# Patient Record
Sex: MALE | Race: OTHER | HISPANIC OR LATINO | URBAN - METROPOLITAN AREA
[De-identification: names, ages, dates, MRNs, and addresses within clinical notes are randomized per-mention and may not be internally consistent; named-entity substitution may affect disease eponyms.]

---

## 2020-11-16 ENCOUNTER — EMERGENCY (EMERGENCY)
Facility: HOSPITAL | Age: 40
LOS: 1 days | Discharge: HOME | End: 2020-11-16
Attending: EMERGENCY MEDICINE
Payer: COMMERCIAL

## 2020-11-16 VITALS
HEART RATE: 107 BPM | HEIGHT: 67 IN | WEIGHT: 164.02 LBS | TEMPERATURE: 99 F | DIASTOLIC BLOOD PRESSURE: 96 MMHG | SYSTOLIC BLOOD PRESSURE: 138 MMHG | RESPIRATION RATE: 20 BRPM | OXYGEN SATURATION: 97 %

## 2020-11-16 PROCEDURE — 99284 EMERGENCY DEPT VISIT MOD MDM: CPT

## 2020-11-16 PROCEDURE — 96372 THER/PROPH/DIAG INJ SC/IM: CPT

## 2020-11-16 PROCEDURE — 73562 X-RAY EXAM OF KNEE 3: CPT | Mod: 26,LT

## 2020-11-16 PROCEDURE — 73562 X-RAY EXAM OF KNEE 3: CPT

## 2020-11-16 PROCEDURE — 99283 EMERGENCY DEPT VISIT LOW MDM: CPT | Mod: 25

## 2020-11-16 RX ORDER — OXYCODONE AND ACETAMINOPHEN 5; 325 MG/1; MG/1
1 TABLET ORAL ONCE
Refills: 0 | Status: DISCONTINUED | OUTPATIENT
Start: 2020-11-16 | End: 2020-11-16

## 2020-11-16 RX ORDER — DEXAMETHASONE 0.5 MG/5ML
10 ELIXIR ORAL ONCE
Refills: 0 | Status: COMPLETED | OUTPATIENT
Start: 2020-11-16 | End: 2020-11-16

## 2020-11-16 RX ADMIN — Medication 10 MILLIGRAM(S): at 17:33

## 2020-11-16 RX ADMIN — OXYCODONE AND ACETAMINOPHEN 1 TABLET(S): 5; 325 TABLET ORAL at 17:33

## 2020-11-16 NOTE — ED STATDOCS - ATTENDING CONTRIBUTION TO CARE
The patient seen and examined    Knee Pain  Most likely ligamentous injury    I, Emmett Arguelles, performed the initial face to face bedside interview with this patient regarding history of present illness, review of symptoms and relevant past medical, social and family history.  I completed an independent physical examination.  I was the initial provider who evaluated this patient. I have signed out the follow up of any pending tests (i.e. labs, radiological studies) to the resident.  I have communicated the patient’s plan of care and disposition with the resident..

## 2020-11-16 NOTE — ED ADULT TRIAGE NOTE - CHIEF COMPLAINT QUOTE
Patient arrived to ED today with c/o left knee pain after injuring himself while stepping up on a belt today.

## 2020-11-16 NOTE — ED PROCEDURE NOTE - ATTENDING CONTRIBUTION TO CARE
I, Emmett Arguelles, performed the initial face to face bedside interview with this patient regarding history of present illness, review of symptoms and relevant past medical, social and family history.  I completed an independent physical examination.  I was the initial provider who evaluated this patient. I have signed out the follow up of any pending tests (i.e. labs, radiological studies) to the resident.  I have communicated the patient’s plan of care and disposition with the resident

## 2020-11-16 NOTE — ED STATDOCS - NS ED ROS FT
CONSTITUTIONAL: No fevers, no chills  Eyes: No vision changes  Cardiovascular: No Chest pain  Respiratory: No SOB  Gastrointestinal: No n/v/c/d, no abd pain  Genitourinary: no dysuria, no hematuria  SKIN: no rashes.  MSK: +left knee pain  NEURO: no headache, no weakness, no numbness  PSYCHIATRIC: no SI/HI

## 2020-11-16 NOTE — ED STATDOCS - OBJECTIVE STATEMENT
Pt is a 39 y/o M w/PMHx ACL repair presents c/o knee pain.  Pt states he was at work at a [employer]  when he was going up stairs and felt hes knee begin to hurt acutely.  Pt states he did not feel an pop, denies trauma.  States it has been painful to walk, but has not taken anything for pain.

## 2020-11-16 NOTE — ED STATDOCS - PATIENT PORTAL LINK FT
You can access the FollowMyHealth Patient Portal offered by [facility] by registering at the following website: [email or web address]. By joining [facility]’s FollowMyHealth portal, you will also be able to view your health information using other applications (apps) compatible with our system.

## 2020-11-16 NOTE — ED STATDOCS - PHYSICAL EXAMINATION
General: well appearing, interactive, well nourished, NAD  HEENT: pupils equal and reactive, normal external ears bilaterally   Cardiac: RRR, no MRG appreciated  Resp: lungs clear to auscultation bilaterally, symmetric chest wall rise  Abd: soft, nontender, nondistended,   : no CVA tenderness  Neuro: Moving all extremities  Skin:  normal color for race  MSK: no deformity left knee, FROM, non ttp, no joint laxity, NVI

## 2020-11-16 NOTE — ED STATDOCS - CARE PROVIDER_API CALL
Calderon Queen)  Orthopaedic Surgery  [address] [place] [address]  Phone: [phone]  Fax: [phone]  Follow Up Time:

## 2020-12-02 ENCOUNTER — APPOINTMENT (OUTPATIENT)
Dept: ORTHOPEDIC SURGERY | Facility: CLINIC | Age: 40
End: 2020-12-02

## 2023-11-27 NOTE — ED PROCEDURE NOTE - CPROC ED COMPLICATIONS1
DR PATEL IS CHANGING YOUR MEDICATION.    DO NOT TAKE CARVEDILOL    NEW:  METOPROLOL SUCCINATE 25 MG ONCE A DAY- THIS WILL REPLACE CARVEDILOL   no

## 2024-01-19 ENCOUNTER — APPOINTMENT (OUTPATIENT)
Dept: GASTROENTEROLOGY | Facility: CLINIC | Age: 44
End: 2024-01-19
Payer: COMMERCIAL

## 2024-01-19 VITALS
OXYGEN SATURATION: 100 % | HEIGHT: 66 IN | DIASTOLIC BLOOD PRESSURE: 74 MMHG | HEART RATE: 70 BPM | RESPIRATION RATE: 16 BRPM | WEIGHT: 170 LBS | SYSTOLIC BLOOD PRESSURE: 118 MMHG | BODY MASS INDEX: 27.32 KG/M2

## 2024-01-19 DIAGNOSIS — Z82.49 FAMILY HISTORY OF ISCHEMIC HEART DISEASE AND OTHER DISEASES OF THE CIRCULATORY SYSTEM: ICD-10-CM

## 2024-01-19 DIAGNOSIS — Z87.19 PERSONAL HISTORY OF OTHER DISEASES OF THE DIGESTIVE SYSTEM: ICD-10-CM

## 2024-01-19 DIAGNOSIS — Z01.818 ENCOUNTER FOR OTHER PREPROCEDURAL EXAMINATION: ICD-10-CM

## 2024-01-19 DIAGNOSIS — Z80.9 FAMILY HISTORY OF MALIGNANT NEOPLASM, UNSPECIFIED: ICD-10-CM

## 2024-01-19 PROCEDURE — 99203 OFFICE O/P NEW LOW 30 MIN: CPT

## 2024-01-19 RX ORDER — IBUPROFEN 800 MG/1
TABLET, FILM COATED ORAL
Refills: 0 | Status: ACTIVE | COMMUNITY

## 2024-01-19 RX ORDER — OMEPRAZOLE MAGNESIUM 40 MG/1
CAPSULE, DELAYED RELEASE ORAL
Refills: 0 | Status: ACTIVE | COMMUNITY

## 2024-01-19 RX ORDER — LORATADINE 5 MG/5 ML
10 SOLUTION, ORAL ORAL
Refills: 0 | Status: ACTIVE | COMMUNITY

## 2024-01-19 NOTE — REVIEW OF SYSTEMS
[Fever] : no fever [Chills] : no chills [Feeling Tired] : not feeling tired [Recent Weight Loss (___ Lbs)] : no recent weight loss [Abdominal Pain] : abdominal pain [Vomiting] : no vomiting [Constipation] : no constipation [Diarrhea] : no diarrhea [Heartburn] : no heartburn [Melena (black stool)] : no melena [Bleeding] : no bleeding [Bloating (gassiness)] : no bloating [Negative] : Heme/Lymph

## 2024-01-19 NOTE — ASSESSMENT
[FreeTextEntry1] : 43-year-old male with a history of gastritis presents for evaluation of left upper quadrant abdominal pain.  Left upper quadrant abdominal pain In the setting of NSAIDs symptoms may be related to gastritis versus peptic ulcer disease Will start pantoprazole 30 minutes before breakfast on an empty stomach Plan for diagnostic endoscopy Will obtain abdominal ultrasound to rule out atypical biliary pathology  He is 43 years old, recommend patient follow-up for colonoscopy in May for colon cancer screening

## 2024-01-19 NOTE — PHYSICAL EXAM
[Alert] : alert [Normal Voice/Communication] : normal voice/communication [No Acute Distress] : no acute distress [Sclera] : the sclera and conjunctiva were normal [Hearing Threshold Finger Rub Not [place]] : hearing was normal [Normal Lips/Gums] : the lips and gums were normal [Oropharynx] : the oropharynx was normal [Normal Appearance] : the appearance of the neck was normal [No Neck Mass] : no neck mass was observed [No Respiratory Distress] : no respiratory distress [No Acc Muscle Use] : no accessory muscle use [Respiration, Rhythm And Depth] : normal respiratory rhythm and effort [Auscultation Breath Sounds / Voice Sounds] : lungs were clear to auscultation bilaterally [Heart Rate And Rhythm] : heart rate was normal and rhythm regular [None] : no edema [Bowel Sounds] : normal bowel sounds [Abdomen Tenderness] : non-tender [No Masses] : no abdominal mass palpated [Abdomen Soft] : soft [Cervical Lymph Nodes Enlarged Posterior Bilaterally] : no posterior cervical lymphadenopathy [Supraclavicular Lymph Nodes Enlarged Bilaterally] : no supraclavicular lymphadenopathy [Cervical Lymph Nodes Enlarged Anterior Bilaterally] : no anterior cervical lymphadenopathy [No CVA Tenderness] : no CVA  tenderness [No Spinal Tenderness] : no spinal tenderness [Abnormal Walk] : normal gait [Normal Color / Pigmentation] : normal skin color and pigmentation [No Focal Deficits] : no focal deficits [Oriented To Time, Place, And Person] : oriented to person, place, and time

## 2024-01-19 NOTE — HISTORY OF PRESENT ILLNESS
[FreeTextEntry1] : 43-year-old male with a history of gastritis presents for evaluation chronic left upper quadrant pain.  He reports burning left upper quadrant pain made worse with meals.  He denies associated nausea or vomiting.  He denies diarrhea or melena.  He has a history of gastritis in the past.  He underwent endoscopy in the past.  He reports NSAID use for musculoskeletal pain.  He denies family history of GI malignancy. Surgical history notable for left knee and left toe surgeries.  He denies tobacco use.  He reports social alcohol use on the weekends.  He denies illicit drug use.  He has tried Prilosec in the past without relief of symptoms.

## 2024-01-29 ENCOUNTER — APPOINTMENT (OUTPATIENT)
Dept: GASTROENTEROLOGY | Facility: GI CENTER | Age: 44
End: 2024-01-29

## 2024-01-29 NOTE — PHYSICAL EXAM
[Alert] : alert [Normal Voice/Communication] : normal voice/communication [No Acute Distress] : no acute distress [Sclera] : the sclera and conjunctiva were normal [Hearing Threshold Finger Rub Not [place]] : hearing was normal [Normal Lips/Gums] : the lips and gums were normal [Oropharynx] : the oropharynx was normal [Normal Appearance] : the appearance of the neck was normal [No Neck Mass] : no neck mass was observed [No Respiratory Distress] : no respiratory distress [No Acc Muscle Use] : no accessory muscle use [Respiration, Rhythm And Depth] : normal respiratory rhythm and effort [Auscultation Breath Sounds / Voice Sounds] : lungs were clear to auscultation bilaterally [Heart Rate And Rhythm] : heart rate was normal and rhythm regular [None] : no edema [Bowel Sounds] : normal bowel sounds [Abdomen Tenderness] : non-tender [No Masses] : no abdominal mass palpated [Abdomen Soft] : soft [Supraclavicular Lymph Nodes Enlarged Bilaterally] : no supraclavicular lymphadenopathy [Cervical Lymph Nodes Enlarged Posterior Bilaterally] : no posterior cervical lymphadenopathy [Cervical Lymph Nodes Enlarged Anterior Bilaterally] : no anterior cervical lymphadenopathy [No CVA Tenderness] : no CVA  tenderness [No Spinal Tenderness] : no spinal tenderness [Abnormal Walk] : normal gait [Normal Color / Pigmentation] : normal skin color and pigmentation [No Focal Deficits] : no focal deficits [Oriented To Time, Place, And Person] : oriented to person, place, and time

## 2024-01-29 NOTE — REVIEW OF SYSTEMS
[Fever] : no fever [Chills] : no chills [Feeling Tired] : not feeling tired [Recent Weight Loss (___ Lbs)] : no recent weight loss [Abdominal Pain] : abdominal pain [Vomiting] : no vomiting [Constipation] : no constipation [Diarrhea] : no diarrhea [Heartburn] : no heartburn [Melena (black stool)] : no melena [Swollen Glands] : no swollen glands [Negative] : Heme/Lymph

## 2024-03-20 ENCOUNTER — OUTPATIENT (OUTPATIENT)
Dept: OUTPATIENT SERVICES | Facility: HOSPITAL | Age: 44
LOS: 1 days | End: 2024-03-20

## 2024-03-20 ENCOUNTER — APPOINTMENT (OUTPATIENT)
Dept: ULTRASOUND IMAGING | Facility: CLINIC | Age: 44
End: 2024-03-20
Payer: COMMERCIAL

## 2024-03-20 DIAGNOSIS — R10.9 UNSPECIFIED ABDOMINAL PAIN: ICD-10-CM

## 2024-03-20 PROCEDURE — 76700 US EXAM ABDOM COMPLETE: CPT | Mod: 26

## 2024-03-28 ENCOUNTER — TRANSCRIPTION ENCOUNTER (OUTPATIENT)
Age: 44
End: 2024-03-28

## 2024-03-29 ENCOUNTER — APPOINTMENT (OUTPATIENT)
Dept: GASTROENTEROLOGY | Facility: GI CENTER | Age: 44
End: 2024-03-29
Payer: COMMERCIAL

## 2024-03-29 ENCOUNTER — OUTPATIENT (OUTPATIENT)
Dept: OUTPATIENT SERVICES | Facility: HOSPITAL | Age: 44
LOS: 1 days | End: 2024-03-29
Payer: COMMERCIAL

## 2024-03-29 ENCOUNTER — RESULT REVIEW (OUTPATIENT)
Age: 44
End: 2024-03-29

## 2024-03-29 DIAGNOSIS — K29.70 GASTRITIS, UNSPECIFIED, WITHOUT BLEEDING: ICD-10-CM

## 2024-03-29 DIAGNOSIS — K29.70 GASTRITIS, UNSPECIFIED, W/OUT BLEEDING: ICD-10-CM

## 2024-03-29 DIAGNOSIS — R10.9 UNSPECIFIED ABDOMINAL PAIN: ICD-10-CM

## 2024-03-29 PROCEDURE — 88305 TISSUE EXAM BY PATHOLOGIST: CPT | Mod: 26

## 2024-03-29 PROCEDURE — 43239 EGD BIOPSY SINGLE/MULTIPLE: CPT

## 2024-03-29 PROCEDURE — 88342 IMHCHEM/IMCYTCHM 1ST ANTB: CPT

## 2024-03-29 PROCEDURE — 88305 TISSUE EXAM BY PATHOLOGIST: CPT

## 2024-03-29 PROCEDURE — 88342 IMHCHEM/IMCYTCHM 1ST ANTB: CPT | Mod: 26

## 2024-03-29 NOTE — PHYSICAL EXAM
[Normal Voice/Communication] : normal voice/communication [Alert] : alert [No Acute Distress] : no acute distress [Sclera] : the sclera and conjunctiva were normal [Normal Lips/Gums] : the lips and gums were normal [Hearing Threshold Finger Rub Not [place]] : hearing was normal [Oropharynx] : the oropharynx was normal [Normal Appearance] : the appearance of the neck was normal [No Neck Mass] : no neck mass was observed [No Acc Muscle Use] : no accessory muscle use [No Respiratory Distress] : no respiratory distress [Auscultation Breath Sounds / Voice Sounds] : lungs were clear to auscultation bilaterally [Respiration, Rhythm And Depth] : normal respiratory rhythm and effort [Heart Rate And Rhythm] : heart rate was normal and rhythm regular [None] : no edema [Bowel Sounds] : normal bowel sounds [No Masses] : no abdominal mass palpated [Abdomen Tenderness] : non-tender [Abdomen Soft] : soft [Cervical Lymph Nodes Enlarged Posterior Bilaterally] : no posterior cervical lymphadenopathy [Supraclavicular Lymph Nodes Enlarged Bilaterally] : no supraclavicular lymphadenopathy [No CVA Tenderness] : no CVA  tenderness [Cervical Lymph Nodes Enlarged Anterior Bilaterally] : no anterior cervical lymphadenopathy [Abnormal Walk] : normal gait [No Spinal Tenderness] : no spinal tenderness [Normal Color / Pigmentation] : normal skin color and pigmentation [No Focal Deficits] : no focal deficits [Oriented To Time, Place, And Person] : oriented to person, place, and time

## 2024-04-02 LAB — SURGICAL PATHOLOGY STUDY: SIGNIFICANT CHANGE UP

## 2024-06-20 ENCOUNTER — RX RENEWAL (OUTPATIENT)
Age: 44
End: 2024-06-20

## 2024-06-20 RX ORDER — PANTOPRAZOLE 40 MG/1
40 TABLET, DELAYED RELEASE ORAL DAILY
Qty: 30 | Refills: 1 | Status: ACTIVE | COMMUNITY
Start: 2024-01-19 | End: 1900-01-01

## 2024-07-16 DIAGNOSIS — R10.12 LEFT UPPER QUADRANT PAIN: ICD-10-CM

## 2024-07-24 ENCOUNTER — APPOINTMENT (OUTPATIENT)
Dept: CT IMAGING | Facility: CLINIC | Age: 44
End: 2024-07-24

## 2024-07-24 ENCOUNTER — RESULT REVIEW (OUTPATIENT)
Age: 44
End: 2024-07-24

## 2024-07-26 DIAGNOSIS — Z12.11 ENCOUNTER FOR SCREENING FOR MALIGNANT NEOPLASM OF COLON: ICD-10-CM

## 2024-07-26 RX ORDER — SODIUM SULFATE, POTASSIUM SULFATE AND MAGNESIUM SULFATE 1.6; 3.13; 17.5 G/177ML; G/177ML; G/177ML
17.5-3.13-1.6 SOLUTION ORAL
Qty: 1 | Refills: 0 | Status: ACTIVE | COMMUNITY
Start: 2024-07-26 | End: 1900-01-01

## 2024-08-23 ENCOUNTER — RX RENEWAL (OUTPATIENT)
Age: 44
End: 2024-08-23

## 2024-09-03 ENCOUNTER — TRANSCRIPTION ENCOUNTER (OUTPATIENT)
Age: 44
End: 2024-09-03

## 2024-09-04 ENCOUNTER — RESULT REVIEW (OUTPATIENT)
Age: 44
End: 2024-09-04

## 2024-09-04 ENCOUNTER — TRANSCRIPTION ENCOUNTER (OUTPATIENT)
Age: 44
End: 2024-09-04

## 2024-09-04 ENCOUNTER — APPOINTMENT (OUTPATIENT)
Dept: GASTROENTEROLOGY | Facility: GI CENTER | Age: 44
End: 2024-09-04
Payer: COMMERCIAL

## 2024-09-04 ENCOUNTER — OUTPATIENT (OUTPATIENT)
Dept: OUTPATIENT SERVICES | Facility: HOSPITAL | Age: 44
LOS: 1 days | End: 2024-09-04
Payer: COMMERCIAL

## 2024-09-04 DIAGNOSIS — Z12.11 ENCOUNTER FOR SCREENING FOR MALIGNANT NEOPLASM OF COLON: ICD-10-CM

## 2024-09-04 DIAGNOSIS — K63.5 POLYP OF COLON: ICD-10-CM

## 2024-09-04 PROCEDURE — 88305 TISSUE EXAM BY PATHOLOGIST: CPT

## 2024-09-04 PROCEDURE — 45385 COLONOSCOPY W/LESION REMOVAL: CPT | Mod: PT

## 2024-09-04 PROCEDURE — 88305 TISSUE EXAM BY PATHOLOGIST: CPT | Mod: 26

## 2024-09-04 PROCEDURE — 45385 COLONOSCOPY W/LESION REMOVAL: CPT | Mod: 33

## 2024-09-04 NOTE — PHYSICAL EXAM
[Alert] : alert [Normal Voice/Communication] : normal voice/communication [No Acute Distress] : no acute distress [Sclera] : the sclera and conjunctiva were normal [Hearing Threshold Finger Rub Not [place]] : hearing was normal [Normal Lips/Gums] : the lips and gums were normal [Oropharynx] : the oropharynx was normal [Normal Appearance] : the appearance of the neck was normal [No Neck Mass] : no neck mass was observed [No Respiratory Distress] : no respiratory distress [No Acc Muscle Use] : no accessory muscle use [Respiration, Rhythm And Depth] : normal respiratory rhythm and effort [Auscultation Breath Sounds / Voice Sounds] : lungs were clear to auscultation bilaterally [Heart Rate And Rhythm] : heart rate was normal and rhythm regular [None] : no edema [Bowel Sounds] : normal bowel sounds [Abdomen Tenderness] : non-tender [No Masses] : no abdominal mass palpated [Abdomen Soft] : soft [Cervical Lymph Nodes Enlarged Posterior Bilaterally] : no posterior cervical lymphadenopathy [Supraclavicular Lymph Nodes Enlarged Bilaterally] : no supraclavicular lymphadenopathy [Cervical Lymph Nodes Enlarged Anterior Bilaterally] : no anterior cervical lymphadenopathy [No CVA Tenderness] : no CVA  tenderness [No Spinal Tenderness] : no spinal tenderness [Abnormal Walk] : normal gait [Normal Color / Pigmentation] : normal skin color and pigmentation [No Focal Deficits] : no focal deficits [Oriented To Time, Place, And Person] : oriented to person, place, and time

## 2024-09-06 LAB — SURGICAL PATHOLOGY STUDY: SIGNIFICANT CHANGE UP

## 2025-01-13 ENCOUNTER — NON-APPOINTMENT (OUTPATIENT)
Age: 45
End: 2025-01-13

## (undated) DEVICE — POLY TRAP ETRAP

## (undated) DEVICE — SNARE MINI-MICRO OVAL

## (undated) DEVICE — KIT DEFENDO 4 OLY 4 PC

## (undated) DEVICE — FORCEP ENDOJAW AGTR LC W NDL 2.8MM 230CM DISP